# Patient Record
(demographics unavailable — no encounter records)

---

## 2024-12-10 NOTE — HISTORY OF PRESENT ILLNESS
[FreeTextEntry6] : Skyla is an 8-year-old F coming in for acute visit for ED follow-up for fever:   Sick with strep throat a few weeks.  Symptoms started on Thursday started to have fever UC tested her positive for flu on Friday.  Fevers have continued. Tmax 105F Last fever this morning, Giving Motrin or Tylenol for the fever.  Also with nasal congestion and cough.  Using Albuterol every 4 hours.  PO intake decreased. Drinking a little, but not much.     Chief Complaint: fever.  - Chief Complaint: The patient is a 8y3m Female complaining of fever. - HPI Objective Statement: 9yo female pmhx of asthma, dx with influenza on thursday at urgent care, now bib mom w co continued fever, intermittent vomiting but tolerating some po, void x 2 so far today. no diarrhea. +cough. mom using albuterol every 4 hours, but last dose today was at 9:30am. nkda immu utd pmd jose l gallo.  HEPATITIS C TEST QUESTIONS: Hepatitis C Test Questions: - In accordance with [place] State Law, we offer every patient a Hepatitis C test. Would you like to be tested today? N/A Patient is under age 18 and does not have a history of high risk behavior or is not high risk for Hep C  PAST MEDICAL/SURGICAL/FAMILY/SOCIAL HISTORY: Past Medical, Past Surgical, and Family History: PAST MEDICAL HISTORY: Asthma.  PAST SURGICAL HISTORY: No significant past surgical history.  FAMILY HISTORY: Family history of asthma in brother, Resolved before age 5.  ALLERGIES AND HOME MEDICATIONS: Allergies: Allergies: No Known Allergies:  Home Medications: * Patient Currently Takes Medications as of 15-Mar-2020 20:16 documented in Structured Notes - ondansetron 4 mg oral tablet, disintegratin tab(s) orally every 8 hours as needed for nausea/vomiting - albuterol 90 mcg/inh inhalation aerosol: 4 puff(s) inhaled every 4 hours - albuterol 0.63 mg/3 mL (0.021%) inhalation solution: 3 milliliter(s) by nebulizer every 4 hours until follow up with pediatrician in 1-2 days - amoxicillin 250 mg/5 mL oral suspension: 15 milliliter(s) orally every 12 hours 3/14-3/21 - prednisoLONE 15 mg/5 mL oral syrup: 6 milliliter(s) orally once a day from 3/15 - 3/16  REVIEW OF SYSTEMS: Review of Systems: - ROS STATEMENT: all other ROS negative except as per HPI  PHYSICAL EXAM: - CONSTITUTIONAL: In no apparent distress. - HEENMT: Airway patent, TM normal bilaterally, normal appearing mouth, nose, throat, neck supple with full range of motion, no cervical adenopathy. - EYES: Pupils equal, round and reactive to light, Extra-ocular movement intact, eyes are clear b/l - CARDIAC: Regular rate and rhythm, Heart sounds S1 S2 present, no murmurs, rubs or gallops - RESPIRATORY: - - - - Breath Sounds: WHEEZES, end expiratory - Wheezes: DIFFUSE - GASTROINTESTINAL: Abdomen soft, non-tender and non-distended, no rebound, no guarding and no masses. no hepatosplenomegaly. - MUSCULOSKELETAL: Spine appears normal, movement of extremities grossly intact. - NEUROLOGICAL: Alert and interactive, no focal deficits - NEURO/PSYCH: Tone is normal, moving all extremities well, reflexes normal for age. - SKIN: No cyanosis, no pallor, no jaundice, no rash - PSYCHIATRIC: Alert and oriented to person, place and time. Normal mood and affect, no apparent risk to self or others - HEME LYMPH: No pallor, no cervical/supraclavicular/inguinal adenopathy. No splenomegaly  RESULTS: Wet Read: There are no Wet Read(s) to document.  DISPOSITION: Care Plan - Instructions: Principal Discharge DX: Influenza Secondary Diagnosis: Asthma exacerbation.  Impression: 1.  Principal Discharge Dx Influenza.  Secondary Discharge Dx Asthma exacerbation.  Medical Decision Making: - The following orders were submitted: Medications - Clinical Summary (MDM): Summarize the clinical encounter 9yo female pmhx of asthma now with influenza dx at urgent care on thursday by rapid test. continues to have fever and intermittent vomting. no diarrhea. on exam pt with diffuse mild end exp wheeze. will give albuterol mdi now as pt missed last q4 hour dose. cont supportive care. fu pmd 1-2 days. - Follow-up Instructions (will be supplied to the patient only if discharged) Viral Illness in Children  Your child was seen in the Emergency Department and diagnosed with a viral infection. Viruses are tiny germs that can get into a person's body and cause illness. A virus is the most common cause of illness and fever among children. There are many different types of viruses, and they cause many types of illness, depending on what part of the body is affected. If the virus settles in the nose, throat, and lungs, it causes cough, congestion, and sometimes headache. If it settles in the stomach and intestinal tract, it may cause vomiting and diarrhea. Sometimes it causes vague symptoms of "feeling bad all over," with fussiness, poor appetite, poor sleeping, and lots of crying. A rash may also appear for the first few days, then fade away. Other symptoms can include earache, sore throat, and swollen glands.  A viral illness usually lasts 3 to 5 days, but sometimes it lasts longer, even up to 1 to 2 weeks. ANTIBIOTICS DONT HELP.  General tips for taking care of a child who has a viral infection: -Have your child rest. -Give your child acetaminophen (Tylenol) and/or ibuprofen (Advil, Motrin) for fever, pain, or fussiness. Read and follow all instructions on the label. -Be careful when giving your child over-the-counter cold or flu medicines and acetaminophen at the same time. Many of these medicines also contain acetaminophen. Read the labels to make sure that you are not giving your child more than the recommended dose. Too much Tylenol can be harmful. -Be careful with cough and cold medicines. Don't give them to children younger than 4 years, because they don't work for children that age and can even be harmful. For children 4 years and older, always follow all the instructions carefully. Make sure you know how much medicine to give and how long to use it. And use the dosing device if one is included. -Attempt to give your child lots of fluids, enough so that the urine is light yellow or clear like water. This is very important if your child is vomiting or has diarrhea. Give your child sips of water or drinks such as Pedialyte. Pedialyte contains a mix of salt, sugar, and minerals. You can buy them at drugstores or grocery stores. Give these drinks as long as your child is throwing up or has diarrhea. Do not use them as the only source of liquids or food for more than 1 to 2 days. -Keep your child home from school, , or other public places while he or she has a fever. Follow up with your pediatrician in 1-2 days to make sure that your child is doing better.  Return to the Emergency Department if: -Your child has symptoms of a viral illness for longer than expected. Ask your aron health care provider how long symptoms should last. -Treatment at home is not controlling your child's symptoms or they are getting worse. -Your child has signs of needing more fluids. These signs include sunken eyes with few tears, dry mouth with little or no spit, and little or no urine for 8-12 hours. -Your child who is younger than 2 months has a temperature of 100.4F (38C) or higher if not already evaluated for that. -Your child has trouble breathing. -Your child has a severe headache or has a stiff neck.  Disposition: Disposition: DISCHARGE.  Patient requests all Lab, Cardiology, and Radiology Results on their Discharge Instructions.  Discharge Disposition: Home.  Discharge Date: 08-Dec-2024.

## 2024-12-10 NOTE — HISTORY OF PRESENT ILLNESS
[FreeTextEntry6] : Skyla is an 8-year-old F coming in for acute visit for ED follow-up for fever:   Sick with strep throat a few weeks.  Symptoms started on Thursday started to have fever UC tested her positive for flu on Friday.  Fevers have continued. Tmax 105F Last fever this morning, Giving Motrin or Tylenol for the fever.  Also with nasal congestion and cough.  Using Albuterol every 4 hours.  PO intake decreased. Drinking a little, but not much.     Chief Complaint: fever.  - Chief Complaint: The patient is a 8y3m Female complaining of fever. - HPI Objective Statement: 7yo female pmhx of asthma, dx with influenza on thursday at urgent care, now bib mom w co continued fever, intermittent vomiting but tolerating some po, void x 2 so far today. no diarrhea. +cough. mom using albuterol every 4 hours, but last dose today was at 9:30am. nkda immu utd pmd jose l gallo.  HEPATITIS C TEST QUESTIONS: Hepatitis C Test Questions: - In accordance with [place] State Law, we offer every patient a Hepatitis C test. Would you like to be tested today? N/A Patient is under age 18 and does not have a history of high risk behavior or is not high risk for Hep C  PAST MEDICAL/SURGICAL/FAMILY/SOCIAL HISTORY: Past Medical, Past Surgical, and Family History: PAST MEDICAL HISTORY: Asthma.  PAST SURGICAL HISTORY: No significant past surgical history.  FAMILY HISTORY: Family history of asthma in brother, Resolved before age 5.  ALLERGIES AND HOME MEDICATIONS: Allergies: Allergies: No Known Allergies:  Home Medications: * Patient Currently Takes Medications as of 15-Mar-2020 20:16 documented in Structured Notes - ondansetron 4 mg oral tablet, disintegratin tab(s) orally every 8 hours as needed for nausea/vomiting - albuterol 90 mcg/inh inhalation aerosol: 4 puff(s) inhaled every 4 hours - albuterol 0.63 mg/3 mL (0.021%) inhalation solution: 3 milliliter(s) by nebulizer every 4 hours until follow up with pediatrician in 1-2 days - amoxicillin 250 mg/5 mL oral suspension: 15 milliliter(s) orally every 12 hours 3/14-3/21 - prednisoLONE 15 mg/5 mL oral syrup: 6 milliliter(s) orally once a day from 3/15 - 3/16  REVIEW OF SYSTEMS: Review of Systems: - ROS STATEMENT: all other ROS negative except as per HPI  PHYSICAL EXAM: - CONSTITUTIONAL: In no apparent distress. - HEENMT: Airway patent, TM normal bilaterally, normal appearing mouth, nose, throat, neck supple with full range of motion, no cervical adenopathy. - EYES: Pupils equal, round and reactive to light, Extra-ocular movement intact, eyes are clear b/l - CARDIAC: Regular rate and rhythm, Heart sounds S1 S2 present, no murmurs, rubs or gallops - RESPIRATORY: - - - - Breath Sounds: WHEEZES, end expiratory - Wheezes: DIFFUSE - GASTROINTESTINAL: Abdomen soft, non-tender and non-distended, no rebound, no guarding and no masses. no hepatosplenomegaly. - MUSCULOSKELETAL: Spine appears normal, movement of extremities grossly intact. - NEUROLOGICAL: Alert and interactive, no focal deficits - NEURO/PSYCH: Tone is normal, moving all extremities well, reflexes normal for age. - SKIN: No cyanosis, no pallor, no jaundice, no rash - PSYCHIATRIC: Alert and oriented to person, place and time. Normal mood and affect, no apparent risk to self or others - HEME LYMPH: No pallor, no cervical/supraclavicular/inguinal adenopathy. No splenomegaly  RESULTS: Wet Read: There are no Wet Read(s) to document.  DISPOSITION: Care Plan - Instructions: Principal Discharge DX: Influenza Secondary Diagnosis: Asthma exacerbation.  Impression: 1.  Principal Discharge Dx Influenza.  Secondary Discharge Dx Asthma exacerbation.  Medical Decision Making: - The following orders were submitted: Medications - Clinical Summary (MDM): Summarize the clinical encounter 7yo female pmhx of asthma now with influenza dx at urgent care on thursday by rapid test. continues to have fever and intermittent vomting. no diarrhea. on exam pt with diffuse mild end exp wheeze. will give albuterol mdi now as pt missed last q4 hour dose. cont supportive care. fu pmd 1-2 days. - Follow-up Instructions (will be supplied to the patient only if discharged) Viral Illness in Children  Your child was seen in the Emergency Department and diagnosed with a viral infection. Viruses are tiny germs that can get into a person's body and cause illness. A virus is the most common cause of illness and fever among children. There are many different types of viruses, and they cause many types of illness, depending on what part of the body is affected. If the virus settles in the nose, throat, and lungs, it causes cough, congestion, and sometimes headache. If it settles in the stomach and intestinal tract, it may cause vomiting and diarrhea. Sometimes it causes vague symptoms of "feeling bad all over," with fussiness, poor appetite, poor sleeping, and lots of crying. A rash may also appear for the first few days, then fade away. Other symptoms can include earache, sore throat, and swollen glands.  A viral illness usually lasts 3 to 5 days, but sometimes it lasts longer, even up to 1 to 2 weeks. ANTIBIOTICS DONT HELP.  General tips for taking care of a child who has a viral infection: -Have your child rest. -Give your child acetaminophen (Tylenol) and/or ibuprofen (Advil, Motrin) for fever, pain, or fussiness. Read and follow all instructions on the label. -Be careful when giving your child over-the-counter cold or flu medicines and acetaminophen at the same time. Many of these medicines also contain acetaminophen. Read the labels to make sure that you are not giving your child more than the recommended dose. Too much Tylenol can be harmful. -Be careful with cough and cold medicines. Don't give them to children younger than 4 years, because they don't work for children that age and can even be harmful. For children 4 years and older, always follow all the instructions carefully. Make sure you know how much medicine to give and how long to use it. And use the dosing device if one is included. -Attempt to give your child lots of fluids, enough so that the urine is light yellow or clear like water. This is very important if your child is vomiting or has diarrhea. Give your child sips of water or drinks such as Pedialyte. Pedialyte contains a mix of salt, sugar, and minerals. You can buy them at drugstores or grocery stores. Give these drinks as long as your child is throwing up or has diarrhea. Do not use them as the only source of liquids or food for more than 1 to 2 days. -Keep your child home from school, , or other public places while he or she has a fever. Follow up with your pediatrician in 1-2 days to make sure that your child is doing better.  Return to the Emergency Department if: -Your child has symptoms of a viral illness for longer than expected. Ask your aron health care provider how long symptoms should last. -Treatment at home is not controlling your child's symptoms or they are getting worse. -Your child has signs of needing more fluids. These signs include sunken eyes with few tears, dry mouth with little or no spit, and little or no urine for 8-12 hours. -Your child who is younger than 2 months has a temperature of 100.4F (38C) or higher if not already evaluated for that. -Your child has trouble breathing. -Your child has a severe headache or has a stiff neck.  Disposition: Disposition: DISCHARGE.  Patient requests all Lab, Cardiology, and Radiology Results on their Discharge Instructions.  Discharge Disposition: Home.  Discharge Date: 08-Dec-2024.

## 2025-01-17 NOTE — REASON FOR VISIT
[Initial Consultation] : an initial consultation for [Allergic Rhinitis] : allergic rhinitis [Asthma] : asthma [Mother] : mother [Medical Records] : medical records

## 2025-01-17 NOTE — IMPRESSION
[_____] : dust mites ([unfilled]) [Allergy Testing Mixed Feathers] : feathers [Allergy Testing Cockroach] : cockroach [Allergy Testing Dog] : dog [Allergy Testing Cat] : cat [Allergy Testing Trees] : trees [] : molds [Allergy Testing Weeds] : weeds [Allergy Testing Grasses] : grasses

## 2025-01-17 NOTE — REASON FOR VISIT
[Initial Consultation] : an initial consultation for [Allergic Rhinitis] : allergic rhinitis [Asthma] : asthma [Mother] : mother [Medical Records] : medical records Implemented All Universal Safety Interventions:  [place] to call system. Call bell, personal items and telephone within reach. Instruct patient to call for assistance. Room bathroom lighting operational. Non-slip footwear when patient is off stretcher. Physically safe environment: no spills, clutter or unnecessary equipment. Stretcher in lowest position, wheels locked, appropriate side rails in place.

## 2025-01-22 NOTE — PHYSICAL EXAM
[Alert] : alert [Well Nourished] : well nourished [Healthy Appearance] : healthy appearance [No Acute Distress] : no acute distress [Well Developed] : well developed [Normal Pupil & Iris Size/Symmetry] : normal pupil and iris size and symmetry [No Discharge] : no discharge [No Photophobia] : no photophobia [Sclera Not Icteric] : sclera not icteric [Normal Nasal Mucosa] : the nasal mucosa was normal [Normal Lips/Tongue] : the lips and tongue were normal [Normal Outer Ear/Nose] : the ears and nose were normal in appearance [Normal Tonsils] : normal tonsils [No Thrush] : no thrush [Pale mucosa] : pale mucosa [Boggy Nasal Turbinates] : boggy and/or pale nasal turbinates [Supple] : the neck was supple [Normal Rate and Effort] : normal respiratory rhythm and effort [No Crackles] : no crackles [No Retractions] : no retractions [Bilateral Audible Breath Sounds] : bilateral audible breath sounds [Normal Rate] : heart rate was normal  [Normal S1, S2] : normal S1 and S2 [No murmur] : no murmur [Regular Rhythm] : with a regular rhythm [Soft] : abdomen soft [Not Distended] : not distended [Not Tender] : non-tender [No HSM] : no hepato-splenomegaly [Normal Cervical Lymph Nodes] : cervical [Patches] : ~M patches present [Xerosis] : xerosis [No clubbing] : no clubbing [No Edema] : no edema [No Cyanosis] : no cyanosis [Normal Mood] : mood was normal [Normal Affect] : affect was normal [Alert, Awake, Oriented as Age-Appropriate] : alert, awake, oriented as age appropriate [Wheezing] : no wheezing was heard

## 2025-01-22 NOTE — PHYSICAL EXAM
[Alert] : alert [Well Nourished] : well nourished [Healthy Appearance] : healthy appearance [No Acute Distress] : no acute distress [Well Developed] : well developed [Normal Pupil & Iris Size/Symmetry] : normal pupil and iris size and symmetry [No Discharge] : no discharge [No Photophobia] : no photophobia [Sclera Not Icteric] : sclera not icteric [Normal Nasal Mucosa] : the nasal mucosa was normal [Normal Lips/Tongue] : the lips and tongue were normal [Normal Outer Ear/Nose] : the ears and nose were normal in appearance [Normal Tonsils] : normal tonsils [No Thrush] : no thrush [Pale mucosa] : pale mucosa [Boggy Nasal Turbinates] : boggy and/or pale nasal turbinates [Supple] : the neck was supple [Normal Rate and Effort] : normal respiratory rhythm and effort [No Crackles] : no crackles [No Retractions] : no retractions [Bilateral Audible Breath Sounds] : bilateral audible breath sounds [Normal Rate] : heart rate was normal  [Normal S1, S2] : normal S1 and S2 [No murmur] : no murmur [Regular Rhythm] : with a regular rhythm [Soft] : abdomen soft [Not Tender] : non-tender [Not Distended] : not distended [No HSM] : no hepato-splenomegaly [Normal Cervical Lymph Nodes] : cervical [Patches] : ~M patches present [Xerosis] : xerosis [No clubbing] : no clubbing [No Edema] : no edema [No Cyanosis] : no cyanosis [Normal Mood] : mood was normal [Normal Affect] : affect was normal [Alert, Awake, Oriented as Age-Appropriate] : alert, awake, oriented as age appropriate [Wheezing] : no wheezing was heard

## 2025-01-22 NOTE — HISTORY OF PRESENT ILLNESS
[Food Allergies] : food allergies [Eczematous rashes] : eczematous rashes [Drug Allergies] : drug allergies [de-identified] : 8 year old female with asthma, allergic rhinitis presents for initial evaluation.     ASTHMA Followed by pulm, has appt 1/30 On symbicort 80./4.5 w/ spacer 2 puffs BID No prednisone  Had flu in Dec. went to ER- got tamiflu  If she stops the medicine she stops with one day   ENVIRONEMNTAL ALLERGIES Immunocaps done in 2022 + cat, DF, DP All year has symptoms - Runny nose , sneezing  Takes cetirizine & flonase every day  +cat x4 in the home, keeps out of bedroom , + air purifyer  No carpets, + curtains  House- no mold, CR, mice  Has DM covers on bed  Uses humidifier   Dry Skin Using Aqauphor morning & evening  Has itching when she doesn't use it    2 weeks ago when she had the flu she was vomiting, which happened a few times after drinking milk. Usually drinking milk gives her diarrhea. No hives, shortness of breath. Tolerates other dairy products like cheese.  FT, no complications

## 2025-01-22 NOTE — PHYSICAL EXAM
#30 sent    Please call pt to schedule CPX with PCP as he has not been seen by him since 9/2022   [Alert] : alert [Well Nourished] : well nourished [Healthy Appearance] : healthy appearance [No Acute Distress] : no acute distress [Well Developed] : well developed [Normal Pupil & Iris Size/Symmetry] : normal pupil and iris size and symmetry [No Discharge] : no discharge [No Photophobia] : no photophobia [Sclera Not Icteric] : sclera not icteric [Normal Nasal Mucosa] : the nasal mucosa was normal [Normal Lips/Tongue] : the lips and tongue were normal [Normal Outer Ear/Nose] : the ears and nose were normal in appearance [Normal Tonsils] : normal tonsils [No Thrush] : no thrush [Pale mucosa] : pale mucosa [Boggy Nasal Turbinates] : boggy and/or pale nasal turbinates [Supple] : the neck was supple [Normal Rate and Effort] : normal respiratory rhythm and effort [No Crackles] : no crackles [No Retractions] : no retractions [Bilateral Audible Breath Sounds] : bilateral audible breath sounds [Normal Rate] : heart rate was normal  [Normal S1, S2] : normal S1 and S2 [No murmur] : no murmur [Regular Rhythm] : with a regular rhythm [Soft] : abdomen soft [Not Tender] : non-tender [Not Distended] : not distended [No HSM] : no hepato-splenomegaly [Normal Cervical Lymph Nodes] : cervical [Patches] : ~M patches present [Xerosis] : xerosis [No clubbing] : no clubbing [No Edema] : no edema [No Cyanosis] : no cyanosis [Normal Mood] : mood was normal [Normal Affect] : affect was normal [Alert, Awake, Oriented as Age-Appropriate] : alert, awake, oriented as age appropriate [Wheezing] : no wheezing was heard

## 2025-01-22 NOTE — CONSULT LETTER
[FreeTextEntry3] : Alona Cordova MD  ___________________________________ Fellow, Division of Allergy and Immunology  [facility] [employer] [facility]/[address], [place] [address] Tel: [phone] Fax: [phone] Email: [email or web address]

## 2025-01-22 NOTE — CONSULT LETTER
[FreeTextEntry3] : Alona Cordova MD  ___________________________________ Fellow, Division of Allergy and Immunology  [facility] [employer] at \[facility]\""/[address], [place] [address] Tel: [phone] Fax: [phone] Email: [email or web address]

## 2025-01-22 NOTE — CONSULT LETTER
[FreeTextEntry3] : Alona Cordova MD  ___________________________________ Fellow, Division of Allergy and Immunology  [facility] [employer] [facility] at [facility]/[address], [place] [address] Tel: [phone] Fax: [phone] Email: [email or web address]

## 2025-01-22 NOTE — HISTORY OF PRESENT ILLNESS
[Food Allergies] : food allergies [Eczematous rashes] : eczematous rashes [Drug Allergies] : drug allergies [de-identified] : 8 year old female with asthma, allergic rhinitis presents for initial evaluation.     ASTHMA Followed by pulm, has appt 1/30 On symbicort 80./4.5 w/ spacer 2 puffs BID No prednisone  Had flu in Dec. went to ER- got tamiflu  If she stops the medicine she stops with one day   ENVIRONEMNTAL ALLERGIES Immunocaps done in 2022 + cat, DF, DP All year has symptoms - Runny nose , sneezing  Takes cetirizine & flonase every day  +cat x4 in the home, keeps out of bedroom , + air purifyer  No carpets, + curtains  House- no mold, CR, mice  Has DM covers on bed  Uses humidifier   Dry Skin Using Aqauphor morning & evening  Has itching when she doesn't use it    2 weeks ago when she had the flu she was vomiting, which happened a few times after drinking milk. Usually drinking milk gives her diarrhea. No hives, shortness of breath. Tolerates other dairy products like cheese.  FT, no complications

## 2025-01-22 NOTE — REVIEW OF SYSTEMS
[Nl] : Cardiovascular [FreeTextEntry4] : see HPI [FreeTextEntry6] : see HPI [FreeTextEntry7] : see HPI [de-identified] : see HPI [de-identified] : see HPI

## 2025-01-22 NOTE — HISTORY OF PRESENT ILLNESS
[Food Allergies] : food allergies [Eczematous rashes] : eczematous rashes [Drug Allergies] : drug allergies [de-identified] : 8 year old female with asthma, allergic rhinitis presents for initial evaluation.     ASTHMA Followed by pulm, has appt 1/30 On symbicort 80./4.5 w/ spacer 2 puffs BID No prednisone  Had flu in Dec. went to ER- got tamiflu  If she stops the medicine she stops with one day   ENVIRONEMNTAL ALLERGIES Immunocaps done in 2022 + cat, DF, DP All year has symptoms - Runny nose , sneezing  Takes cetirizine & flonase every day  +cat x4 in the home, keeps out of bedroom , + air purifyer  No carpets, + curtains  House- no mold, CR, mice  Has DM covers on bed  Uses humidifier   Dry Skin Using Aqauphor morning & evening  Has itching when she doesn't use it    2 weeks ago when she had the flu she was vomiting, which happened a few times after drinking milk. Usually drinking milk gives her diarrhea. No hives, shortness of breath. Tolerates other dairy products like cheese.  FT, no complications

## 2025-01-22 NOTE — REVIEW OF SYSTEMS
[Nl] : Cardiovascular [FreeTextEntry4] : see HPI [FreeTextEntry6] : see HPI [FreeTextEntry7] : see HPI [de-identified] : see HPI [de-identified] : see HPI

## 2025-01-22 NOTE — SOCIAL HISTORY
[Bedroom] : not in the bedroom [Basement] : not in the basement [Living Area] : not in the living area

## 2025-01-22 NOTE — REVIEW OF SYSTEMS
[Nl] : Cardiovascular [FreeTextEntry4] : see HPI [FreeTextEntry6] : see HPI [FreeTextEntry7] : see HPI [de-identified] : see HPI [de-identified] : see HPI

## 2025-01-30 NOTE — HISTORY OF PRESENT ILLNESS
[FreeTextEntry1] : Mild persistent asthma, allergic rhinitis (cats, dust) Hx of mild COVID 19 infection May 2022  Jan 30, 2025 FOLLOW UP: Interval Hx: -Last seen Sept 2024, recs: Symbicort 80 2 puffs BID, montelukast, allergy eval -Doing well , few URIs this fall/winter Daily meds:Symbicort 80 2 puffs BID, montelukast, Rescue meds: Albuterol PRN  Recent ER visits/hospitalizations: No Last oral steroid course: June 2024 Baseline daytime cough, SOB or wheeze: Denies Baseline nocturnal cough, SOB or wheeze: Denies Exertional cough, SOB or wheeze: +tired Allergic rhinitis symptoms: +sneeze and cough Flu vaccine [id number]: yes COVID 19 vaccine: denies ==   Sep 24, 2024 FOLLOW UP: Interval Hx: -Last seen July 2024, recs: Symbicort 80 2 puffs BID, add montelukast, albuterol q4h -Did well in August  -Currently sick with URI for a week with fever, sore throat, cough- seen in , negative for flu/covid and strep. Used albuterol 2x daily last week Daily meds: Symbicort 80 2 puffs BID, montelukast Rescue meds: Albuterol PRN Recent ER visits/hospitalizations: denies Last oral steroid course: June 2024, July 2024 Recent ER visits/hospitalizations: No Last oral steroid course: June 2024 Baseline daytime cough, SOB or wheeze: Denies Baseline nocturnal cough, SOB or wheeze: Denies Exertional cough, SOB or wheeze: +tired Allergic rhinitis symptoms: +sneeze and cough Flu vaccine [id number]: Yes COVID 19 vaccine: No  ==   Jul 30, 2024 FOLLOW UP: Interval Hx:  -Last seen 6/2024, recs: prednisolone x 5 days, symbicort 2 puffs BID -Used prednisolone at last visit with good control of symnptoms, stopped symbicort 1 week ago after cough resolved. Did not get montelukast from pharmacy -New cough today with nasal congestion -Endorses allergy symptoms daily, sneezing in AM, using claritin daily, no flonase  Daily meds: none Rescue meds: Albuterol PRN Recent ER visits/hospitalizations: No Last oral steroid course: June 2024 Baseline daytime cough, SOB or wheeze: Denies Baseline nocturnal cough, SOB or wheeze: Denies Exertional cough, SOB or wheeze: +tired Allergic rhinitis symptoms: +sneeze and cough Flu vaccine [id number]: Yes COVID 19 vaccine: No   ==   Jun 11, 2024 FOLLOW UP: Interval Hx: -Last seen Oct 2023, recs: restart  fluticasone propionate 110 2 puffs BID -Daily wet cough for 6 months, worse during spring months -Frequent UC visits, required antibiotics x4. Also had wheezing on exam but no OCS per mother  -Seen in UC few days ago for cough and sore throat, has strep throat, on amoxicillin day 2 of 10 Daily meds:  fluticasone propionate 110 2 puffs BID Rescue meds: Albuterol PRN Recent ER visits/hospitalizations: denies Last oral steroid course: denies Baseline daytime cough, SOB or wheeze: denies  Baseline nocturnal cough, SOB or wheeze: denies Exertional cough, SOB or wheeze: denies Allergic rhinitis symptoms:  zyrtec daily  : ==  Oct 11, 2023 FOLLOW UP: Interval Hx:  -Last seen May 2023, recs: Flovent 110 2 puffs BID -Doing well, only used flovent for 2 weeks then cough improved and mother self discontinued -Did well over summer months with no exacerbations Daily meds: denies Rescue meds: Albuterol PRN  Recent ER visits/hospitalizations: denies Last oral steroid course:  Nov 2022  Baseline daytime cough, SOB or wheeze: denies   Baseline nocturnal cough, SOB or wheeze:  denies  Exertional cough, SOB or wheeze: denies  Allergic rhinitis symptoms: yes, cetirizine and flonase  PRN  Flu vaccine: yes  COVID 19 vaccine:  denies  ==  May 11, 2023 FOLLOW UP: Interval Hx:  -Last seen Dec 2022, noted to be doing well, recs: Flovent 44 2 puffs BID -Chronic cough for past 6 months- daily, does not improve. Is mostly dry and worse in AM -History of snoring for past month  -Seen in ED last night for nausea and cough, CXR clear, RVP for R/E  Daily meds: Flovent 44 2 puffs BID Rescue meds: Albuterol PRN  Recent ER visits/hospitalizations: denies  Last oral steroid course:  Nov 2022  Baseline daytime cough, SOB or wheeze: denies   Baseline nocturnal cough, SOB or wheeze:  denies  Exertional cough, SOB or wheeze: denies  Allergic rhinitis symptoms: yes, cetirizine and flonase   Flu vaccine: yes  COVID 19 vaccine:  denies   ==   Dec 22, 2022 FOLLOW UP: Interval Hx:  -Last seen Aug 2022, doing well on Flovent 44 , recs: d/c ICS for summer and restart 9/1/22  -URI in October that lasted 3 weeks, caused [place] palsy  -Restarted Flovent 44 2 puffs BID in Sept 2022  Daily meds: Flovent 44 2 puffs BID  Rescue meds: Albuterol PRN  Recent ER visits/hospitalizations: denies  Last oral steroid course:  Nov 2022  Baseline daytime cough, SOB or wheeze: denies   Baseline nocturnal cough, SOB or wheeze:  denies  Exertional cough, SOB or wheeze: denies  Allergic rhinitis symptoms: yes  Flu vaccine: yes  COVID 19 vaccine:  denies     ==   Aug 09, 2022 FOLLOW UP: Interval Hx:  -Last seen June 2022 , started on Flovent 44 2 puffs BID, immunocap +cats and dust -Doing well, cough has resolved -Mother reports she has sneezing, rhinitis symptoms Daily meds: Flovent 44 2 puffs BID  Rescue meds: Albuterol PRN  Recent ER visits/hospitalizations: denies  Last oral steroid course:  denies  Baseline daytime cough, SOB or wheeze: denies   Baseline nocturnal cough, SOB or wheeze:  denies  Exertional cough, SOB or wheeze: denies  Allergic rhinitis symptoms: yes  Flu vaccine: denies  COVID 19 vaccine:  denies  Misc notes: n/a  ==  Jun 07, 2022 NEW PATIENT  5yr old female child with chronic cough and rhinorrhea for 9 months. Used albuterol nebs with some improvement Nighttime posttussive emesis.[place] [facility] [place] , tx with 10 days amox but cough did not improve. s/p mild COVID 19 last month 5/5/22 - cough, runny nose. Hx of RSV bronchiolitis requiring admission at 4yo  PMH:  denies  PSH: denies  Meds:  Albuterol 2 puffs with spacer once in AM Birth Hx: FT, NVSD- denies complications PCP/Specialists:  Dr. Anibal Moreno Family hx:  Mo- Healthy Fa- Healthy Sister, 18yo,  16yo, 3yo- Healthy Brother, 17yo - Childhood asthma , symptoms resovled at 6-8yo Family hx of asthma:   Brother  Family hx of cystic fibrosis, autoimmune disease, recurrent respiratory infections:  denies  Feeding issues, KENNA:  Hx of Eczema:  dry skin Hx of rhinitis, post nasal drip:  suspected during spring- watery eyes, rhinorrhea  Hx of recurrent infections (ie: pneumonia, AOM, sinusitis): PNA x1 Seen by pulmonologist before: denies   Cough Hx: Triggers: viral URI Allergies:  suspected Hx of wheezing: denies Use of oral steroids: denies  ED/Hospitalizations:  hospitalized at 4yo for RSV bronchiolitis  Snoring:   sometimes, denies witnessed apneas Daytime cough: yes  Nighttime cough:  yes  Respiratory symptoms with exercise: denies  Chest x-ray:  yes in [facility]  Vaccines UTD, rec flu shot, no COVID 19 vax  Modified Asthma Predictive Index (mAPI): 4 wheezing illnesses AND 1 major criteria: Parental asthma   NO  atopic dermatitis   NO aeroallergen sensitization  NO  OR  2 minor criteria: Food sensitization   NO  peripheral blood eosinophilia =4%  NO  wheezing apart from colds   NO

## 2025-01-30 NOTE — END OF VISIT
[Time Spent: ___ minutes] : I have spent [unfilled] minutes of time on the encounter which excludes teaching and separately reported services. [FreeTextEntry3] : I, Stephanie Coley, RRT have acted as a scribe and documented the HPI information for Alisa Galeano NP.  The HPI documentation completed by the scribe is an accurate record of both my words and actions.

## 2025-01-30 NOTE — PHYSICAL EXAM
[Well Nourished] : well nourished [Well Developed] : well developed [Alert] : ~L alert [Active] : active [Normal Breathing Pattern] : normal breathing pattern [No Respiratory Distress] : no respiratory distress [No Drainage] : no drainage [No Conjunctivitis] : no conjunctivitis [Tympanic Membranes Clear] : tympanic membranes were clear [No Polyps] : no polyps [No Oral Pallor] : no oral pallor [No Oral Cyanosis] : no oral cyanosis [Non-Erythematous] : non-erythematous [No Exudates] : no exudates [No Tonsillar Enlargement] : no tonsillar enlargement [Absence Of Retractions] : absence of retractions [Symmetric] : symmetric [Good Expansion] : good expansion [No Acc Muscle Use] : no accessory muscle use [Equal Breath Sounds] : equal breath sounds bilaterally [No Crackles] : no crackles [No Rhonchi] : no rhonchi [No Heart Murmur] : no heart murmur [Soft, Non-Tender] : soft, non-tender [Non Distended] : was not ~L distended [Full ROM] : full range of motion [No Clubbing] : no clubbing [Capillary Refill < 2 secs] : capillary refill less than two seconds [No Kyphoscoliosis] : no kyphoscoliosis [No Contractures] : no contractures [Alert and  Oriented] : alert and oriented [No Rashes] : no rashes [FreeTextEntry1] : overweight [FreeTextEntry2] : ruba johnson/l  [FreeTextEntry4] : clear rhinorrhea  [FreeTextEntry5] : +PND [FreeTextEntry7] : LLL expiratory wheeze

## 2025-01-30 NOTE — BIRTH HISTORY
Back Pain HPI





- General


Chief Complaint: Back Pain/Injury


Stated Complaint: Abd Pain/Nausea


Time Seen by Provider: 07/04/18 08:12


Source: patient, RN notes reviewed, old records reviewed


Limitations: no limitations





- History of Present Illness


Initial Comments: 





Patient is a 84-year-old female presents emergency Department with her son 

chief complaint of left-sided abdominal pain and back pain.  She can she maybe 

UTI.  Patient has history of dementia and is a poor historian.  She reports 

that she's been having the pain for a while, however the son stated they just 

told her today.  Patient has had no recent antibiotics.  Patient reports no 

fevers or chills.  She reports she's had normal stools, some diarrhea.  She 

denies any blood in her stools.  No nausea or emesis.





- Related Data


 Home Medications











 Medication  Instructions  Recorded  Confirmed


 


Donepezil [Aricept] 10 mg PO HS 10/08/17 03/21/18


 


Losartan Potassium 75 mg PO DAILY 10/08/17 03/21/18


 


amLODIPine [Norvasc] 10 mg PO DAILY 10/08/17 03/21/18


 


metFORMIN HCL [Glucophage] 500 mg PO AC-BID 10/08/17 03/21/18


 


Atenolol [Tenormin] 50 mg PO BID 03/21/18 03/21/18


 


Magnesium Oxide [Mag-Ox] 400 mg PO BID 03/21/18 03/21/18


 


Memantine [Namenda] 5 mg PO BID 03/21/18 03/21/18


 


Pantoprazole [Protonix] 40 mg PO DAILY 03/21/18 03/21/18


 


glipiZIDE [Glucotrol] 10 mg PO AC-BID 03/21/18 03/21/18








 Previous Rx's











 Medication  Instructions  Recorded


 


Cefuroxime Axetil [Ceftin] 500 mg PO BID #14 tab 03/25/18


 


Ciprofloxacin HCl [Cipro] 500 mg PO Q12HR 10 Days  tab 07/04/18


 


metroNIDAZOLE [Flagyl] 500 mg PO TID #30 tab 07/04/18











 Allergies











Allergy/AdvReac Type Severity Reaction Status Date / Time


 


No Known Allergies Allergy   Verified 07/04/18 08:09














Review of Systems


ROS Statement: 


Those systems with pertinent positive or pertinent negative responses have been 

documented in the HPI.





ROS Other: All systems not noted in ROS Statement are negative.





Past Medical History


Past Medical History: Dementia, Diabetes Mellitus, Hypertension


Additional Past Medical History / Comment(s): uti-ecoli


History of Any Multi-Drug Resistant Organisms: ESBL


Date of last positivie culture/infection: 10/8/17


MDRO Source:: URINE ESBL


Past Surgical History: Cholecystectomy


Past Anesthesia/Blood Transfusion Reactions: No Reported Reaction


Past Psychological History: No Psychological Hx Reported


Smoking Status: Never smoker


Past Alcohol Use History: None Reported


Past Drug Use History: None Reported





- Past Family History


  ** Father


History Unknown: Yes





  ** Mother


History Unknown: Yes





General Exam





- General Exam Comments


Initial Comments: 





84-year-old female with history of dementia.  Alert. 


Limitations: no limitations


General appearance: alert, in no apparent distress


Head exam: Present: atraumatic, normocephalic, normal inspection


Eye exam: Present: normal appearance, PERRL, EOMI.  Absent: scleral icterus, 

conjunctival injection, periorbital swelling


ENT exam: Present: normal exam, mucous membranes moist


Neck exam: Present: normal inspection.  Absent: tenderness, meningismus, 

lymphadenopathy


Respiratory exam: Present: normal lung sounds bilaterally.  Absent: respiratory 

distress, wheezes, rales, rhonchi, stridor


Cardiovascular Exam: Present: regular rate, normal rhythm, normal heart sounds.

  Absent: systolic murmur, diastolic murmur, rubs, gallop, clicks


GI/Abdominal exam: Present: soft, tenderness (Lower quadrant tenderness.), 

normal bowel sounds.  Absent: distended, guarding, rebound, rigid


Extremities exam: Present: normal inspection, full ROM, normal capillary 

refill.  Absent: tenderness, pedal edema, joint swelling, calf tenderness


Back exam: Present: normal inspection


Neurological exam: Present: alert, oriented X3, CN II-XII intact, normal gait


Psychiatric exam: Present: normal affect, normal mood


Skin exam: Present: warm, dry, intact, normal color.  Absent: rash





Course


 Vital Signs











  07/04/18





  08:07


 


Temperature 98.1 F


 


Pulse Rate 56 L


 


Respiratory 20





Rate 


 


Blood Pressure 174/74


 


O2 Sat by Pulse 97





Oximetry 














Medical Decision Making





- Medical Decision Making





Patient is an 84-year-old female presents emergency department today chief 

complaint of left sided abdominal pain radiates towards her back.  Patient has 

history of dementia.  Family's concern for UTI.  We did a straight cath urine.  

No significant UTI.  She has some left lower quadrant tenderness.  At this time 

patient's labwork was reviewed and were also unremarkable.  CT of the pelvis 

without contrast was completed.  Evidence of diverticulitis.  We'll start the 

Patient on Cipro and Flagyl.  Patient will follow-up with PCP in the next 1-2 

days.  All questions answered return parameters were discussed.





- Lab Data


Result diagrams: 


 07/04/18 08:30





 07/04/18 08:30


 Lab Results











  07/04/18 07/04/18 07/04/18 Range/Units





  08:30 08:30 08:30 


 


WBC  7.4    (3.8-10.6)  k/uL


 


RBC  4.61    (3.80-5.40)  m/uL


 


Hgb  12.9    (11.4-16.0)  gm/dL


 


Hct  39.5    (34.0-46.0)  %


 


MCV  85.7    (80.0-100.0)  fL


 


MCH  27.9    (25.0-35.0)  pg


 


MCHC  32.6    (31.0-37.0)  g/dL


 


RDW  14.9    (11.5-15.5)  %


 


Plt Count  284    (150-450)  k/uL


 


Neutrophils %  62    %


 


Lymphocytes %  23    %


 


Monocytes %  7    %


 


Eosinophils %  6    %


 


Basophils %  0    %


 


Neutrophils #  4.6    (1.3-7.7)  k/uL


 


Lymphocytes #  1.7    (1.0-4.8)  k/uL


 


Monocytes #  0.5    (0-1.0)  k/uL


 


Eosinophils #  0.4    (0-0.7)  k/uL


 


Basophils #  0.0    (0-0.2)  k/uL


 


Sodium   139   (137-145)  mmol/L


 


Potassium   4.5   (3.5-5.1)  mmol/L


 


Chloride   101   ()  mmol/L


 


Carbon Dioxide   27   (22-30)  mmol/L


 


Anion Gap   11   mmol/L


 


BUN   20 H   (7-17)  mg/dL


 


Creatinine   0.91   (0.52-1.04)  mg/dL


 


Est GFR (CKD-EPI)AfAm   67   (>60 ml/min/1.73 sqM)  


 


Est GFR (CKD-EPI)NonAf   58   (>60 ml/min/1.73 sqM)  


 


Glucose   163 H   (74-99)  mg/dL


 


Calcium   9.5   (8.4-10.2)  mg/dL


 


Total Bilirubin   0.7   (0.2-1.3)  mg/dL


 


AST   27   (14-36)  U/L


 


ALT   25   (9-52)  U/L


 


Alkaline Phosphatase   71   ()  U/L


 


Total Protein   6.8   (6.3-8.2)  g/dL


 


Albumin   4.0   (3.5-5.0)  g/dL


 


Amylase   48   ()  U/L


 


Lipase   93   ()  U/L


 


Urine Color    Yellow  


 


Urine Appearance    Clear  (Clear)  


 


Urine pH    7.0  (5.0-8.0)  


 


Ur Specific Gravity    1.014  (1.001-1.035)  


 


Urine Protein    3+ H  (Negative)  


 


Urine Glucose (UA)    3+ H  (Negative)  


 


Urine Ketones    Negative  (Negative)  


 


Urine Blood    Negative  (Negative)  


 


Urine Nitrite    Negative  (Negative)  


 


Urine Bilirubin    Negative  (Negative)  


 


Urine Urobilinogen    <2.0  (<2.0)  mg/dL


 


Ur Leukocyte Esterase    Trace H  (Negative)  


 


Urine RBC    1  (0-5)  /hpf


 


Urine WBC    7 H  (0-5)  /hpf


 


Ur Squamous Epith Cells    <1  (0-4)  /hpf














- Radiology Data


Radiology results: report reviewed


Stable findings with prior CT study with evidence of diverticulosis.  Slight 

increased attenuation of mesenteric fat within the sigmoid region indicating 

inflammatory changes of diverticulitis.  No drainable abscess.  No unusual 

fluid collection.  Appendix is not visual.  No abnormal fat attenuation of the 

pericecal fat.  It pruritic changes within the spine with vacuum phenomenon and 

osteo-arthritic spur formation.  Arthritic changes of the hip joints.  No free 

air bowel instruction.





Disposition


Clinical Impression: 


 Diverticulitis





Disposition: HOME SELF-CARE


Condition: Good


Instructions:  Diverticulitis (ED), Diverticulitis Diet (ED)


Additional Instructions: 


Patient advised to avoid any nuts or small foods that can be exacerbating that 

diverticulitis.  Take all the antibiotics as prescribed.  Follow-up with 

primary care provider in the next 1-2 days.


Prescriptions: 


Ciprofloxacin HCl [Cipro] 500 mg PO Q12HR 10 Days  tab


metroNIDAZOLE [Flagyl] 500 mg PO TID #30 tab


Is patient prescribed a controlled substance at d/c from ED?: No


When asked, does pt state using other controlled substances?: No


If prescribed controlled substance>3 days was MAPS reviewed?: No


If opioid is for acute pain is fill amount 7 days or less?: No


If Rx opioid, was Start Talking consent form obtained?: No


Referrals: 


Cipriano Burleson MD [Primary Care Provider] - 1-2 days


Time of Disposition: 09:29 [At Term] : at term [Normal Vaginal Route] : by normal vaginal route [None] : there were no delivery complications [Age Appropriate] : age appropriate developmental milestones met

## 2025-01-30 NOTE — SOCIAL HISTORY
[Mother] : mother [Father] : father [Brother] : brother [___ Sisters] : [unfilled] sisters [House] : [unfilled] lives in a house  [Cat] : cat [Smokers in Household] : there are no smokers in the home [de-identified] : new cats in March 2021

## 2025-01-30 NOTE — REVIEW OF SYSTEMS
[NI] : Genitourinary  [Nl] : Endocrine [Rhinorrhea] : rhinorrhea [Nasal Congestion] : nasal congestion [Cough] : cough [Bronchiolitis] : bronchiolitis

## 2025-02-28 NOTE — HISTORY OF PRESENT ILLNESS
[Mother] : mother [Fruit] : fruit [Vegetables] : vegetables [Meat] : meat [Eggs] : eggs [Fish] : fish [Dairy] : dairy [___ stools every other day] : [unfilled]  stools every other day [Toilet Trained] : toilet trained [In own bed] : In own bed [Sleeps ___ hours per night] : sleeps [unfilled] hours per night [Brushing teeth twice/d] : brushing teeth twice per day [Yes] : Patient goes to dentist yearly [Toothpaste] : Primary Fluoride Source: Toothpaste [Has Friends] : has friends [Grade ___] : Grade [unfilled] [Adequate social interactions] : adequate social interactions [Appropriately restrained in motor vehicle] : appropriately restrained in motor vehicle [Up to date] : Up to date [FreeTextEntry1] : Skyla is a 7 yo F with history of asthma presenting for [facility]. She reports recent episodes of abdominal pain and diarrhea after eating dairy and spicy foods. Patient stopped eating dairy and believes she is lactose intolerant. now beginning to introduce lactose-free dairy products. For exercise, she says she plays in recess, but otherwise does not get physical exercise. For her asthma, she takes Symbicort BID. She has had no recent asthma exacerbations requiring urgent care or ED visit. Patient al on Cetrixine for allergies, which she takes daily all year. Also reports dry skin on extensor surfaces of BUE and BLE.  Patient sleeps in pull-ups, but has not had and bed-wetting, in recent years, other than 1 episode 6 months ago which was after drinking a lot of water before bedtime.

## 2025-02-28 NOTE — PHYSICAL EXAM
[Alert] : alert [No Acute Distress] : no acute distress [Normocephalic] : normocephalic [Conjunctivae with no discharge] : conjunctivae with no discharge [Auricles Well Formed] : auricles well formed [Clear Tympanic membranes with present light reflex and bony landmarks] : clear tympanic membranes with present light reflex and bony landmarks [No Discharge] : no discharge [Nares Patent] : nares patent [Palate Intact] : palate intact [Nonerythematous Oropharynx] : nonerythematous oropharynx [Supple, full passive range of motion] : supple, full passive range of motion [No Palpable Masses] : no palpable masses [Symmetric Chest Rise] : symmetric chest rise [Clear to Auscultation Bilaterally] : clear to auscultation bilaterally [Regular Rate and Rhythm] : regular rate and rhythm [Normal S1, S2 present] : normal S1, S2 present [No Murmurs] : no murmurs [Soft] : soft [NonTender] : non tender [Non Distended] : non distended [No Hepatomegaly] : no hepatomegaly [No Splenomegaly] : no splenomegaly [No Gait Asymmetry] : no gait asymmetry [Normal Muscle Tone] : normal muscle tone [de-identified] : Dry skin to extensor surfaces of bilateral knees and elbows. Keratosis pilaris to bilateral upper legs. Mild acanthosis Nigracans to neck.

## 2025-02-28 NOTE — DISCUSSION/SUMMARY
[Normal Growth] : growth [Normal Development] : development [None] : No known medical problems [No Elimination Concerns] : elimination [No Feeding Concerns] : feeding [Normal Sleep Pattern] : sleep [No Medications] : ~He/She~ is not on any medications [Patient] : patient [Full Activity without restrictions including Physical Education & Athletics] : Full Activity without restrictions including Physical Education & Athletics [FreeTextEntry1] : Skyla is a 9 yo female with history of asthma presenting for [facility].  Patient's weight in the 97th percentile, found to have acanthosis nigricans. Will check CMP, Hgb A2c, and lipid profile. Advised no need for patient to wear pull-ups to sleep, given age and no recent history of enuresis.

## 2025-02-28 NOTE — HISTORY OF PRESENT ILLNESS
[Mother] : mother [Fruit] : fruit [Vegetables] : vegetables [Meat] : meat [Eggs] : eggs [Fish] : fish [Dairy] : dairy [___ stools every other day] : [unfilled]  stools every other day [Toilet Trained] : toilet trained [In own bed] : In own bed [Sleeps ___ hours per night] : sleeps [unfilled] hours per night [Brushing teeth twice/d] : brushing teeth twice per day [Yes] : Patient goes to dentist yearly [Toothpaste] : Primary Fluoride Source: Toothpaste [Has Friends] : has friends [Grade ___] : Grade [unfilled] [Adequate social interactions] : adequate social interactions [Appropriately restrained in motor vehicle] : appropriately restrained in motor vehicle [Up to date] : Up to date [FreeTextEntry1] : Skyla is a 7 yo F with history of asthma presenting for [facility]. She reports recent episodes of abdominal pain and diarrhea after eating dairy and spicy foods. Patient stopped eating dairy and believes she is lactose intolerant. now beginning to introduce lactose-free dairy products. For exercise, she says she plays in recess, but otherwise does not get physical exercise. For her asthma, she takes Symbicort BID. She has had no recent asthma exacerbations requiring urgent care or ED visit. Patient al on Cetrixine for allergies, which she takes daily all year. Also reports dry skin on extensor surfaces of BUE and BLE.  Patient sleeps in pull-ups, but has not had and bed-wetting, in recent years, other than 1 episode 6 months ago which was after drinking a lot of water before bedtime.

## 2025-02-28 NOTE — PHYSICAL EXAM
[Alert] : alert [No Acute Distress] : no acute distress [Normocephalic] : normocephalic [Conjunctivae with no discharge] : conjunctivae with no discharge [Auricles Well Formed] : auricles well formed [Clear Tympanic membranes with present light reflex and bony landmarks] : clear tympanic membranes with present light reflex and bony landmarks [No Discharge] : no discharge [Nares Patent] : nares patent [Palate Intact] : palate intact [Nonerythematous Oropharynx] : nonerythematous oropharynx [Supple, full passive range of motion] : supple, full passive range of motion [No Palpable Masses] : no palpable masses [Symmetric Chest Rise] : symmetric chest rise [Clear to Auscultation Bilaterally] : clear to auscultation bilaterally [Regular Rate and Rhythm] : regular rate and rhythm [Normal S1, S2 present] : normal S1, S2 present [No Murmurs] : no murmurs [Soft] : soft [NonTender] : non tender [Non Distended] : non distended [No Hepatomegaly] : no hepatomegaly [No Splenomegaly] : no splenomegaly [No Gait Asymmetry] : no gait asymmetry [Normal Muscle Tone] : normal muscle tone [de-identified] : Dry skin to extensor surfaces of bilateral knees and elbows. Keratosis pilaris to bilateral upper legs. Mild acanthosis Nigracans to neck.

## 2025-02-28 NOTE — END OF VISIT
[] : A student assisted with documenting this visit. I have reviewed and verified all information documented by the student, and made modifications to such information, when appropriate. [FreeTextEntry3] : Skyla is a 7 yo F hx of moderate persistent asthma, pediatric obesity here for [facility], no acute concerns. Appears to have lactose intolerance, discussed avoidance of lactose. Recent fall on L leg, full ROM, no pain. Mild keratosis pilaris-no treatment needed. Acanthosis nigrans noted on exam, will obtain labs for metabolic syndrome. Failed vision screen, optho referral.

## 2025-02-28 NOTE — DISCUSSION/SUMMARY
[Normal Growth] : growth [Normal Development] : development [None] : No known medical problems [No Elimination Concerns] : elimination [No Feeding Concerns] : feeding [Normal Sleep Pattern] : sleep [No Medications] : ~He/She~ is not on any medications [Patient] : patient [Full Activity without restrictions including Physical Education & Athletics] : Full Activity without restrictions including Physical Education & Athletics [FreeTextEntry1] : Skyla is a 7 yo female with history of asthma presenting for WCC.  Patient's weight in the 97th percentile, found to have acanthosis nigricans. Will check CMP, Hgb A2c, and lipid profile. Advised no need for patient to wear pull-ups to sleep, given age and no recent history of enuresis.

## 2025-04-24 NOTE — HISTORY OF PRESENT ILLNESS
[FreeTextEntry1] : Mild persistent asthma, allergic rhinitis (cats, dust) Hx of mild COVID 19 infection May 2022  Apr 24, 2025 FOLLOW UP: Interval Hx: -Last seen Jan 2025, recs: symbicort 80 2 puffs BID, add montelukast, allergy eval -On and off recurrent cough since the last visit with audible wheezing, using albuterol PRN with good control -No recent OCS -Pt did not like taste of montelukast so she is not using it -Has f/u with allergy but mom needs to postpone due to state exams, will likely proceed with IT Daily meds: symbicort 80 2 puffs BID, cetirizine  Rescue meds: Albuterol PRN Recent ER visits/hospitalizations: No Last oral steroid course: June 2024 Baseline daytime cough, SOB or wheeze: Denies Baseline nocturnal cough, SOB or wheeze: Denies Exertional cough, SOB or wheeze: +tired Allergic rhinitis symptoms: +sneeze and cough Flu vaccine [id number]: yes COVID 19 vaccine: denies ==   Jan 30, 2025 FOLLOW UP: Interval Hx: -Last seen Sept 2024, recs: Symbicort 80 2 puffs BID, montelukast, allergy eval -Doing well , few URIs this fall/winter Daily meds:Symbicort 80 2 puffs BID, montelukast, Rescue meds: Albuterol PRN  Recent ER visits/hospitalizations: No Last oral steroid course: June 2024 Baseline daytime cough, SOB or wheeze: Denies Baseline nocturnal cough, SOB or wheeze: Denies Exertional cough, SOB or wheeze: +tired Allergic rhinitis symptoms: +sneeze and cough Flu vaccine [id number]: yes COVID 19 vaccine: denies ==   Sep 24, 2024 FOLLOW UP: Interval Hx: -Last seen July 2024, recs: Symbicort 80 2 puffs BID, add montelukast, albuterol q4h -Did well in August  -Currently sick with URI for a week with fever, sore throat, cough- seen in , negative for flu/covid and strep. Used albuterol 2x daily last week Daily meds: Symbicort 80 2 puffs BID, montelukast Rescue meds: Albuterol PRN Recent ER visits/hospitalizations: denies Last oral steroid course: June 2024, July 2024 Recent ER visits/hospitalizations: No Last oral steroid course: June 2024 Baseline daytime cough, SOB or wheeze: Denies Baseline nocturnal cough, SOB or wheeze: Denies Exertional cough, SOB or wheeze: +tired Allergic rhinitis symptoms: +sneeze and cough Flu vaccine [id number]: Yes COVID 19 vaccine: No  ==   Jul 30, 2024 FOLLOW UP: Interval Hx:  -Last seen 6/2024, recs: prednisolone x 5 days, symbicort 2 puffs BID -Used prednisolone at last visit with good control of symnptoms, stopped symbicort 1 week ago after cough resolved. Did not get montelukast from pharmacy -New cough today with nasal congestion -Endorses allergy symptoms daily, sneezing in AM, using claritin daily, no flonase  Daily meds: none Rescue meds: Albuterol PRN Recent ER visits/hospitalizations: No Last oral steroid course: June 2024 Baseline daytime cough, SOB or wheeze: Denies Baseline nocturnal cough, SOB or wheeze: Denies Exertional cough, SOB or wheeze: +tired Allergic rhinitis symptoms: +sneeze and cough Flu vaccine [id number]: Yes COVID 19 vaccine: No   ==   Jun 11, 2024 FOLLOW UP: Interval Hx: -Last seen Oct 2023, recs: restart  fluticasone propionate 110 2 puffs BID -Daily wet cough for 6 months, worse during spring months -Frequent UC visits, required antibiotics x4. Also had wheezing on exam but no OCS per mother  -Seen in UC few days ago for cough and sore throat, has strep throat, on amoxicillin day 2 of 10 Daily meds:  fluticasone propionate 110 2 puffs BID Rescue meds: Albuterol PRN Recent ER visits/hospitalizations: denies Last oral steroid course: denies Baseline daytime cough, SOB or wheeze: denies  Baseline nocturnal cough, SOB or wheeze: denies Exertional cough, SOB or wheeze: denies Allergic rhinitis symptoms:  zyrtec daily  : ==  Oct 11, 2023 FOLLOW UP: Interval Hx:  -Last seen May 2023, recs: Flovent 110 2 puffs BID -Doing well, only used flovent for 2 weeks then cough improved and mother self discontinued -Did well over summer months with no exacerbations Daily meds: denies Rescue meds: Albuterol PRN  Recent ER visits/hospitalizations: denies Last oral steroid course:  Nov 2022  Baseline daytime cough, SOB or wheeze: denies   Baseline nocturnal cough, SOB or wheeze:  denies  Exertional cough, SOB or wheeze: denies  Allergic rhinitis symptoms: yes, cetirizine and flonase  PRN  Flu vaccine: yes  COVID 19 vaccine:  denies  ==  May 11, 2023 FOLLOW UP: Interval Hx:  -Last seen Dec 2022, noted to be doing well, recs: Flovent 44 2 puffs BID -Chronic cough for past 6 months- daily, does not improve. Is mostly dry and worse in AM -History of snoring for past month  -Seen in ED last night for nausea and cough, CXR clear, RVP for R/E  Daily meds: Flovent 44 2 puffs BID Rescue meds: Albuterol PRN  Recent ER visits/hospitalizations: denies  Last oral steroid course:  Nov 2022  Baseline daytime cough, SOB or wheeze: denies   Baseline nocturnal cough, SOB or wheeze:  denies  Exertional cough, SOB or wheeze: denies  Allergic rhinitis symptoms: yes, cetirizine and flonase   Flu vaccine: yes  COVID 19 vaccine:  denies   ==   Dec 22, 2022 FOLLOW UP: Interval Hx:  -Last seen Aug 2022, doing well on Flovent 44 , recs: d/c ICS for summer and restart 9/1/22  -URI in October that lasted 3 weeks, caused [place] palsy  -Restarted Flovent 44 2 puffs BID in Sept 2022  Daily meds: Flovent 44 2 puffs BID  Rescue meds: Albuterol PRN  Recent ER visits/hospitalizations: denies  Last oral steroid course:  Nov 2022  Baseline daytime cough, SOB or wheeze: denies   Baseline nocturnal cough, SOB or wheeze:  denies  Exertional cough, SOB or wheeze: denies  Allergic rhinitis symptoms: yes  Flu vaccine: yes  COVID 19 vaccine:  denies     ==   Aug 09, 2022 FOLLOW UP: Interval Hx:  -Last seen June 2022 , started on Flovent 44 2 puffs BID, immunocap +cats and dust -Doing well, cough has resolved -Mother reports she has sneezing, rhinitis symptoms Daily meds: Flovent 44 2 puffs BID  Rescue meds: Albuterol PRN  Recent ER visits/hospitalizations: denies  Last oral steroid course:  denies  Baseline daytime cough, SOB or wheeze: denies   Baseline nocturnal cough, SOB or wheeze:  denies  Exertional cough, SOB or wheeze: denies  Allergic rhinitis symptoms: yes  Flu vaccine: denies  COVID 19 vaccine:  denies  Misc notes: n/a  ==  Jun 07, 2022 NEW PATIENT  5yr old female child with chronic cough and rhinorrhea for 9 months. Used albuterol nebs with some improvement Nighttime posttussive emesis.[place] [facility] [place] , tx with 10 days amox but cough did not improve. s/p mild COVID 19 last month 5/5/22 - cough, runny nose. Hx of RSV bronchiolitis requiring admission at 2yo  PMH:  denies  PSH: denies  Meds:  Albuterol 2 puffs with spacer once in AM Birth Hx: FT, NVSD- denies complications PCP/Specialists:  Dr. Anibal Moreno Family hx:  Mo- Healthy Fa- Healthy Sister, 20yo,  14yo, 5yo- Healthy Brother, 15yo - Childhood asthma , symptoms resovled at 6-6yo Family hx of asthma:   Brother  Family hx of cystic fibrosis, autoimmune disease, recurrent respiratory infections:  denies  Feeding issues, KENNA:  Hx of Eczema:  dry skin Hx of rhinitis, post nasal drip:  suspected during spring- watery eyes, rhinorrhea  Hx of recurrent infections (ie: pneumonia, AOM, sinusitis): PNA x1 Seen by pulmonologist before: denies   Cough Hx: Triggers: viral URI Allergies:  suspected Hx of wheezing: denies Use of oral steroids: denies  ED/Hospitalizations:  hospitalized at 2yo for RSV bronchiolitis  Snoring:   sometimes, denies witnessed apneas Daytime cough: yes  Nighttime cough:  yes  Respiratory symptoms with exercise: denies  [place]:  [place] [facility] [place]  Vaccines UTD, rec flu shot, no COVID 19 vax  Modified Asthma Predictive Index (mAPI): 4 wheezing illnesses AND 1 major criteria: Parental asthma   NO  atopic dermatitis   NO aeroallergen sensitization  NO  OR  2 minor criteria: Food sensitization   NO  peripheral blood eosinophilia =4%  NO  wheezing apart from colds   NO

## 2025-04-24 NOTE — SOCIAL HISTORY
[Mother] : mother [Father] : father [Brother] : brother [___ Sisters] : [unfilled] sisters [House] : [unfilled] lives in a house  [Cat] : cat [Smokers in Household] : there are no smokers in the home [de-identified] : new cats in March 2021

## 2025-04-24 NOTE — SOCIAL HISTORY
[Mother] : mother [Father] : father [Brother] : brother [___ Sisters] : [unfilled] sisters [House] : [unfilled] lives in a house  [Cat] : cat [Smokers in Household] : there are no smokers in the home [de-identified] : new cats in March 2021

## 2025-04-24 NOTE — PHYSICAL EXAM
[Well Nourished] : well nourished [Well Developed] : well developed [Alert] : ~L alert [Active] : active [Normal Breathing Pattern] : normal breathing pattern [No Respiratory Distress] : no respiratory distress [No Drainage] : no drainage [No Conjunctivitis] : no conjunctivitis [Tympanic Membranes Clear] : tympanic membranes were clear [No Nasal Drainage] : no nasal drainage [No Polyps] : no polyps [No Sinus Tenderness] : no sinus tenderness [No Oral Pallor] : no oral pallor [No Oral Cyanosis] : no oral cyanosis [Non-Erythematous] : non-erythematous [No Exudates] : no exudates [No Tonsillar Enlargement] : no tonsillar enlargement [Absence Of Retractions] : absence of retractions [Symmetric] : symmetric [Good Expansion] : good expansion [No Acc Muscle Use] : no accessory muscle use [Equal Breath Sounds] : equal breath sounds bilaterally [No Crackles] : no crackles [No Rhonchi] : no rhonchi [No Heart Murmur] : no heart murmur [Soft, Non-Tender] : soft, non-tender [No Hepatosplenomegaly] : no hepatosplenomegaly [Non Distended] : was not ~L distended [Abdomen Mass (___ Cm)] : no abdominal mass palpated [Full ROM] : full range of motion [No Clubbing] : no clubbing [Capillary Refill < 2 secs] : capillary refill less than two seconds [No Cyanosis] : no cyanosis [No Petechiae] : no petechiae [No Kyphoscoliosis] : no kyphoscoliosis [No Contractures] : no contractures [Alert and  Oriented] : alert and oriented [No Rashes] : no rashes [FreeTextEntry7] : LLL expiratory wheeze [Good aeration to bases] : good aeration to bases [No Wheezing] : no wheezing [FreeTextEntry1] : overweight [FreeTextEntry2] : ruba johnson/l  [FreeTextEntry4] : clear rhinorrhea , nasal congestion [FreeTextEntry5] : +PND [FreeTextEntry8] : S1S2

## 2025-04-24 NOTE — CONSULT LETTER
[Dear  ___] : Dear  [unfilled], [Consult Letter:] : I had the pleasure of evaluating your patient, [unfilled]. [Please see my note below.] : Please see my note below. [Consult Closing:] : Thank you very much for allowing me to participate in the care of this patient.  If you have any questions, please do not hesitate to contact me. [Sincerely,] : Sincerely, [FreeTextEntry3] : If you have any questions please feel free to contact my office at [phone].\par  \par  Sincerely,\par  \par  Alisa Galeano, MSN, CPNP-PC\par  Pediatric Nurse Practitioner\par  Division of Pediatric Pulmonary Medicine & Cystic Fibrosis Center\par  [facility]\par

## 2025-04-24 NOTE — HISTORY OF PRESENT ILLNESS
[FreeTextEntry1] : Mild persistent asthma, allergic rhinitis (cats, dust) Hx of mild COVID 19 infection May 2022  Apr 24, 2025 FOLLOW UP: Interval Hx: -Last seen Jan 2025, recs: symbicort 80 2 puffs BID, add montelukast, allergy eval -On and off recurrent cough since the last visit with audible wheezing, using albuterol PRN with good control -No recent OCS -Pt did not like taste of montelukast so she is not using it -Has f/u with allergy but mom needs to postpone due to state exams, will likely proceed with IT Daily meds: symbicort 80 2 puffs BID, cetirizine  Rescue meds: Albuterol PRN Recent ER visits/hospitalizations: No Last oral steroid course: June 2024 Baseline daytime cough, SOB or wheeze: Denies Baseline nocturnal cough, SOB or wheeze: Denies Exertional cough, SOB or wheeze: +tired Allergic rhinitis symptoms: +sneeze and cough Flu vaccine [id number]: yes COVID 19 vaccine: denies ==   Jan 30, 2025 FOLLOW UP: Interval Hx: -Last seen Sept 2024, recs: Symbicort 80 2 puffs BID, montelukast, allergy eval -Doing well , few URIs this fall/winter Daily meds:Symbicort 80 2 puffs BID, montelukast, Rescue meds: Albuterol PRN  Recent ER visits/hospitalizations: No Last oral steroid course: June 2024 Baseline daytime cough, SOB or wheeze: Denies Baseline nocturnal cough, SOB or wheeze: Denies Exertional cough, SOB or wheeze: +tired Allergic rhinitis symptoms: +sneeze and cough Flu vaccine [id number]: yes COVID 19 vaccine: denies ==   Sep 24, 2024 FOLLOW UP: Interval Hx: -Last seen July 2024, recs: Symbicort 80 2 puffs BID, add montelukast, albuterol q4h -Did well in August  -Currently sick with URI for a week with fever, sore throat, cough- seen in , negative for flu/covid and strep. Used albuterol 2x daily last week Daily meds: Symbicort 80 2 puffs BID, montelukast Rescue meds: Albuterol PRN Recent ER visits/hospitalizations: denies Last oral steroid course: June 2024, July 2024 Recent ER visits/hospitalizations: No Last oral steroid course: June 2024 Baseline daytime cough, SOB or wheeze: Denies Baseline nocturnal cough, SOB or wheeze: Denies Exertional cough, SOB or wheeze: +tired Allergic rhinitis symptoms: +sneeze and cough Flu vaccine [id number]: Yes COVID 19 vaccine: No  ==   Jul 30, 2024 FOLLOW UP: Interval Hx:  -Last seen 6/2024, recs: prednisolone x 5 days, symbicort 2 puffs BID -Used prednisolone at last visit with good control of symnptoms, stopped symbicort 1 week ago after cough resolved. Did not get montelukast from pharmacy -New cough today with nasal congestion -Endorses allergy symptoms daily, sneezing in AM, using claritin daily, no flonase  Daily meds: none Rescue meds: Albuterol PRN Recent ER visits/hospitalizations: No Last oral steroid course: June 2024 Baseline daytime cough, SOB or wheeze: Denies Baseline nocturnal cough, SOB or wheeze: Denies Exertional cough, SOB or wheeze: +tired Allergic rhinitis symptoms: +sneeze and cough Flu vaccine [id number]: Yes COVID 19 vaccine: No   ==   Jun 11, 2024 FOLLOW UP: Interval Hx: -Last seen Oct 2023, recs: restart  fluticasone propionate 110 2 puffs BID -Daily wet cough for 6 months, worse during spring months -Frequent UC visits, required antibiotics x4. Also had wheezing on exam but no OCS per mother  -Seen in UC few days ago for cough and sore throat, has strep throat, on amoxicillin day 2 of 10 Daily meds:  fluticasone propionate 110 2 puffs BID Rescue meds: Albuterol PRN Recent ER visits/hospitalizations: denies Last oral steroid course: denies Baseline daytime cough, SOB or wheeze: denies  Baseline nocturnal cough, SOB or wheeze: denies Exertional cough, SOB or wheeze: denies Allergic rhinitis symptoms:  zyrtec daily  : ==  Oct 11, 2023 FOLLOW UP: Interval Hx:  -Last seen May 2023, recs: Flovent 110 2 puffs BID -Doing well, only used flovent for 2 weeks then cough improved and mother self discontinued -Did well over summer months with no exacerbations Daily meds: denies Rescue meds: Albuterol PRN  Recent ER visits/hospitalizations: denies Last oral steroid course:  Nov 2022  Baseline daytime cough, SOB or wheeze: denies   Baseline nocturnal cough, SOB or wheeze:  denies  Exertional cough, SOB or wheeze: denies  Allergic rhinitis symptoms: yes, cetirizine and flonase  PRN  Flu vaccine: yes  COVID 19 vaccine:  denies  ==  May 11, 2023 FOLLOW UP: Interval Hx:  -Last seen Dec 2022, noted to be doing well, recs: Flovent 44 2 puffs BID -Chronic cough for past 6 months- daily, does not improve. Is mostly dry and worse in AM -History of snoring for past month  -Seen in ED last night for nausea and cough, CXR clear, RVP for R/E  Daily meds: Flovent 44 2 puffs BID Rescue meds: Albuterol PRN  Recent ER visits/hospitalizations: denies  Last oral steroid course:  Nov 2022  Baseline daytime cough, SOB or wheeze: denies   Baseline nocturnal cough, SOB or wheeze:  denies  Exertional cough, SOB or wheeze: denies  Allergic rhinitis symptoms: yes, cetirizine and flonase   Flu vaccine: yes  COVID 19 vaccine:  denies   ==   Dec 22, 2022 FOLLOW UP: Interval Hx:  -Last seen Aug 2022, doing well on Flovent 44 , recs: d/c ICS for summer and restart 9/1/22  -URI in October that lasted 3 weeks, caused [place] palsy  -Restarted Flovent 44 2 puffs BID in Sept 2022  Daily meds: Flovent 44 2 puffs BID  Rescue meds: Albuterol PRN  Recent ER visits/hospitalizations: denies  Last oral steroid course:  Nov 2022  Baseline daytime cough, SOB or wheeze: denies   Baseline nocturnal cough, SOB or wheeze:  denies  Exertional cough, SOB or wheeze: denies  Allergic rhinitis symptoms: yes  Flu vaccine: yes  COVID 19 vaccine:  denies     ==   Aug 09, 2022 FOLLOW UP: Interval Hx:  -Last seen June 2022 , started on Flovent 44 2 puffs BID, immunocap +cats and dust -Doing well, cough has resolved -Mother reports she has sneezing, rhinitis symptoms Daily meds: Flovent 44 2 puffs BID  Rescue meds: Albuterol PRN  Recent ER visits/hospitalizations: denies  Last oral steroid course:  denies  Baseline daytime cough, SOB or wheeze: denies   Baseline nocturnal cough, SOB or wheeze:  denies  Exertional cough, SOB or wheeze: denies  Allergic rhinitis symptoms: yes  Flu vaccine: denies  COVID 19 vaccine:  denies  Misc notes: n/a  ==  Jun 07, 2022 NEW PATIENT  5yr old female child with chronic cough and rhinorrhea for 9 months. Used albuterol nebs with some improvement Nighttime posttussive emesis.[place] [facility] [place] , tx with 10 days amox but cough did not improve. s/p mild COVID 19 last month 5/5/22 - cough, runny nose. Hx of RSV bronchiolitis requiring admission at 4yo  PMH:  denies  PSH: denies  Meds:  Albuterol 2 puffs with spacer once in AM Birth Hx: FT, NVSD- denies complications PCP/Specialists:  Dr. Anibal Moreno Family hx:  Mo- Healthy Fa- Healthy Sister, 18yo,  14yo, 3yo- Healthy Brother, 15yo - Childhood asthma , symptoms resovled at 6-8yo Family hx of asthma:   Brother  Family hx of cystic fibrosis, autoimmune disease, recurrent respiratory infections:  denies  Feeding issues, KENNA:  Hx of Eczema:  dry skin Hx of rhinitis, post nasal drip:  suspected during spring- watery eyes, rhinorrhea  Hx of recurrent infections (ie: pneumonia, AOM, sinusitis): PNA x1 Seen by pulmonologist before: denies   Cough Hx: Triggers: viral URI Allergies:  suspected Hx of wheezing: denies Use of oral steroids: denies  ED/Hospitalizations:  hospitalized at 4yo for RSV bronchiolitis  Snoring:   sometimes, denies witnessed apneas Daytime cough: yes  Nighttime cough:  yes  Respiratory symptoms with exercise: denies  [place] [facility] [place]  Vaccines UTD, rec flu shot, no COVID 19 vax  Modified Asthma Predictive Index (mAPI): 4 wheezing illnesses AND 1 major criteria: Parental asthma   NO  atopic dermatitis   NO aeroallergen sensitization  NO  OR  2 minor criteria: Food sensitization   NO  peripheral blood eosinophilia =4%  NO  wheezing apart from colds   NO

## 2025-04-24 NOTE — CONSULT LETTER
[Dear  ___] : Dear  [unfilled], [Consult Letter:] : I had the pleasure of evaluating your patient, [unfilled]. [Please see my note below.] : Please see my note below. [Consult Closing:] : Thank you very much for allowing me to participate in the care of this patient.  If you have any questions, please do not hesitate to contact me. [Sincerely,] : Sincerely, [FreeTextEntry3] : If you have any questions please feel free to contact my office at [phone].\par  \par  Sincerely,\par  \par  Alisa Galeano, MSN, CPNP-PC\par  Pediatric Nurse Practitioner\par  Division of Pediatric Pulmonary Medicine & Cystic Fibrosis Center\par  [facility]\par

## 2025-06-18 NOTE — PHYSICAL EXAM
[Alert] : alert [NL] : grossly EOMI [Soft] : soft [Moves All Extremities x 4] : moves all extremities x4 [Acute Distress] : no acute distress [Distended] : nondistended

## 2025-06-18 NOTE — HISTORY OF PRESENT ILLNESS
[de-identified] : Travel Vaccines [FreeTextEntry6] : 8y old girl with asthma presenting for travel vaccines. Patient and her family will be traveling to [facility] (staying in [place]) at the end of June, then going to [facility] from 8/26-9/15. In [place] [facility] they will be staying in [place] and [place], and participating in the [facility] and [facility]. They will be visiting family. She has otherwise been healthy, mom requests refills of asthma medications - cetirizine, symbicort, and albuterol.

## 2025-06-18 NOTE — DISCUSSION/SUMMARY
[] : The components of the vaccine(s) to be administered today are listed in the plan of care. The disease(s) for which the vaccine(s) are intended to prevent and the risks have been discussed with the caretaker.  The risks are also included in the appropriate vaccination information statements which have been provided to the patient's caregiver.  The caregiver has given consent to vaccinate. [FreeTextEntry1] : 8y old girl with asthma presenting for travel vaccines for [place] ([place] and [place] ([place], with [facility] and [facility] participation). Patient otherwise feeling well, no other concerns, appears well. Administered typhoid and meningococcal vaccines, and recommended they go to Pediatric Infect[facility] Disease or a [facility] for [place] and yellow fever, No malarial prophylaxis is necessary. Advised routine travel precautions, including avoiding mosquitos, drinking bottled water, avoiding raw or improperly handled foods, and sun safety. Sent refills for cetirizine, symbicort, and albuterol.